# Patient Record
Sex: FEMALE | Race: WHITE | Employment: FULL TIME | URBAN - METROPOLITAN AREA
[De-identification: names, ages, dates, MRNs, and addresses within clinical notes are randomized per-mention and may not be internally consistent; named-entity substitution may affect disease eponyms.]

---

## 2018-02-20 ENCOUNTER — NURSE TRIAGE (OUTPATIENT)
Dept: NURSING | Facility: CLINIC | Age: 38
End: 2018-02-20

## 2018-02-20 NOTE — TELEPHONE ENCOUNTER
Patient states she woke with an UTI this morning and is heading out of town and doesn't believe she will be able to make a clinic appointment to be seen; asking if can prescribe antibiotic over phone.  FNA informed she would have to be seen as has not had a clinic appointment since 2016.  FNA offered to transfer to scheduling for appointment availability and discussed option of [facility].  Patient will look into [facility] options.

## 2018-11-01 ENCOUNTER — TELEPHONE (OUTPATIENT)
Dept: NURSING | Facility: CLINIC | Age: 38
End: 2018-11-01

## 2018-11-05 ENCOUNTER — NURSE TRIAGE (OUTPATIENT)
Dept: NURSING | Facility: CLINIC | Age: 38
End: 2018-11-05

## 2018-11-05 NOTE — TELEPHONE ENCOUNTER
Received letter from HIM stating there are no immunizations found in patients [facility] Records.  Called and left detailed vm regarding above. Encouraged pt to call with any further questions.

## 2018-11-05 NOTE — TELEPHONE ENCOUNTER
Olvin is calling with questions regarding her immunizations and states that she will call back after 8am.

## 2021-04-22 ENCOUNTER — ANCILLARY PROCEDURE (OUTPATIENT)
Dept: MAMMOGRAPHY | Facility: CLINIC | Age: 41
End: 2021-04-22
Attending: OBSTETRICS & GYNECOLOGY
Payer: COMMERCIAL

## 2021-04-22 DIAGNOSIS — Z12.31 VISIT FOR SCREENING MAMMOGRAM: ICD-10-CM

## 2021-04-22 PROCEDURE — 77067 SCR MAMMO BI INCL CAD: CPT | Mod: GC

## 2021-04-22 PROCEDURE — 77063 BREAST TOMOSYNTHESIS BI: CPT | Mod: GC

## 2021-04-26 ENCOUNTER — OFFICE VISIT (OUTPATIENT)
Dept: OBGYN | Facility: CLINIC | Age: 41
End: 2021-04-26
Payer: COMMERCIAL

## 2021-04-26 VITALS
BODY MASS INDEX: 23.3 KG/M2 | DIASTOLIC BLOOD PRESSURE: 70 MMHG | SYSTOLIC BLOOD PRESSURE: 115 MMHG | HEART RATE: 76 BPM | HEIGHT: 69 IN | OXYGEN SATURATION: 98 % | WEIGHT: 157.3 LBS

## 2021-04-26 DIAGNOSIS — Z30.432 ENCOUNTER FOR IUD REMOVAL: ICD-10-CM

## 2021-04-26 DIAGNOSIS — Z13.220 LIPID SCREENING: ICD-10-CM

## 2021-04-26 DIAGNOSIS — Z00.00 ROUTINE GENERAL MEDICAL EXAMINATION AT A HEALTH CARE FACILITY: ICD-10-CM

## 2021-04-26 DIAGNOSIS — Z30.430 ENCOUNTER FOR INSERTION OF MIRENA IUD: ICD-10-CM

## 2021-04-26 DIAGNOSIS — L98.9 SKIN LESION: ICD-10-CM

## 2021-04-26 DIAGNOSIS — Z13.1 SCREENING FOR DIABETES MELLITUS: ICD-10-CM

## 2021-04-26 DIAGNOSIS — Z12.4 SCREENING FOR CERVICAL CANCER: ICD-10-CM

## 2021-04-26 DIAGNOSIS — Z12.31 ENCOUNTER FOR SCREENING MAMMOGRAM FOR BREAST CANCER: Primary | ICD-10-CM

## 2021-04-26 DIAGNOSIS — Z13.29 SCREENING FOR THYROID DISORDER: ICD-10-CM

## 2021-04-26 DIAGNOSIS — Z00.00 ANNUAL PHYSICAL EXAM: Primary | ICD-10-CM

## 2021-04-26 DIAGNOSIS — Z13.1 SCREENING FOR DIABETES MELLITUS: Primary | ICD-10-CM

## 2021-04-26 LAB
CHOLEST SERPL-MCNC: 166 MG/DL
GLUCOSE SERPL-MCNC: 100 MG/DL (ref 70–99)
HDLC SERPL-MCNC: 80 MG/DL
LDLC SERPL CALC-MCNC: 79 MG/DL
NONHDLC SERPL-MCNC: 86 MG/DL
TRIGL SERPL-MCNC: 37 MG/DL
TSH SERPL DL<=0.005 MIU/L-ACNC: 0.99 MU/L (ref 0.4–4)

## 2021-04-26 PROCEDURE — 84443 ASSAY THYROID STIM HORMONE: CPT | Performed by: OBSTETRICS & GYNECOLOGY

## 2021-04-26 PROCEDURE — 80061 LIPID PANEL: CPT | Performed by: OBSTETRICS & GYNECOLOGY

## 2021-04-26 PROCEDURE — 36415 COLL VENOUS BLD VENIPUNCTURE: CPT | Performed by: OBSTETRICS & GYNECOLOGY

## 2021-04-26 PROCEDURE — 99386 PREV VISIT NEW AGE 40-64: CPT | Mod: 25 | Performed by: OBSTETRICS & GYNECOLOGY

## 2021-04-26 PROCEDURE — 87624 HPV HI-RISK TYP POOLED RSLT: CPT | Performed by: OBSTETRICS & GYNECOLOGY

## 2021-04-26 PROCEDURE — 82947 ASSAY GLUCOSE BLOOD QUANT: CPT | Performed by: OBSTETRICS & GYNECOLOGY

## 2021-04-26 PROCEDURE — 58301 REMOVE INTRAUTERINE DEVICE: CPT | Performed by: OBSTETRICS & GYNECOLOGY

## 2021-04-26 PROCEDURE — 58300 INSERT INTRAUTERINE DEVICE: CPT | Performed by: OBSTETRICS & GYNECOLOGY

## 2021-04-26 PROCEDURE — G0145 SCR C/V CYTO,THINLAYER,RESCR: HCPCS | Performed by: OBSTETRICS & GYNECOLOGY

## 2021-04-26 ASSESSMENT — MIFFLIN-ST. JEOR: SCORE: 1439.38

## 2021-04-26 ASSESSMENT — PAIN SCALES - GENERAL: PAINLEVEL: NO PAIN (0)

## 2021-04-26 NOTE — PATIENT INSTRUCTIONS
If you have any questions regarding your visit, Please contact your care team.    [employer]: [phone]      [facility] CLINIC HOURS TELEPHONE NUMBER   Mariana Hernandez DO.    Macy -  Daysi -     ALEXANDER Bragg RN     Monday, Wednesday, [facility]  8:30a.m-5:00 p.m   [facility]  [address] [place] [address]  [phone] ask for [facility]    Imaging [phone]       Urgent Care locations:    [facility] Saturday and Sunday   9 am - 5 pm    Monday-Friday   11 pm - 7 pm  Saturday and Sunday   9 am - 5 pm   [phone]     [facility] Labor and Delivery:  [phone]    If you need a medication refill, please contact your pharmacy. Please allow 3 business days for your refill to be completed.  As always, Thank you for trusting us with your healthcare needs!    Preventive Health Recommendations  Female Ages 40 to 49    Yearly exam:     See your health care provider every year in order to  1. Review health changes.   2. Discuss preventive care.    3. Review your medicines if your doctor prescribed any.      Get a Pap test every three years (unless you have an abnormal result and your provider advises testing more often).      If you get Pap tests with HPV test, you only need to test every 5 years, unless you have an abnormal result. You do not need a Pap test if your uterus was removed (hysterectomy) and you have not had cancer.      You should be tested each year for STDs (sexually transmitted diseases), if you're at risk.     Ask your doctor if you should have a mammogram.      Have a colonoscopy (test for colon cancer) if someone in your family has had colon cancer or polyps before age 50.       Have a cholesterol test every 5 years.       Have a diabetes test (fasting glucose) after  age 45. If you are at risk for diabetes, you should have this test every 3 years.    Shots: Get a flu shot each year. Get a tetanus shot every 10 years.     Nutrition:     Eat at least 5 servings of fruits and vegetables each day.    Eat whole-grain bread, whole-wheat pasta and brown rice instead of white grains and rice.    Get adequate Calcium and Vitamin D.      Lifestyle    Exercise at least 150 minutes a week (an average of 30 minutes a day, 5 days a week). This will help you control your weight and prevent disease.    Limit alcohol to one drink per day.    No smoking.     Wear sunscreen to prevent skin cancer.    See your dentist every six months for an exam and cleaning.

## 2021-04-26 NOTE — PROGRESS NOTES
Olvin is a 41 year old female, , who is here as a new patient to the  gyn dept for her annual exam and IUD removal followed by insertion of a new Mirena.  This will be her fourth Mirena IUD and she has not had a menses since her first one was inserted in .  She uses it for both contraception as well as maintenance of an amenorrheic state.  Her current IUD was placed in 2016 so is due for removal this month.  She does not feel for the strings.  She is in a fasting state this morning so will collect needed labwork.  She is also due for a pap smear but denies any hx of abnormals.  She is overdue for a skin check so will place a Derm referral.    ROS: Ten point review of systems was reviewed and negative except the above.    Health Maintenance   Topic Date Due     ADVANCE CARE PLANNING  Never done     HIV SCREENING  Never done     HEPATITIS C SCREENING  Never done     DTAP/TDAP/TD IMMUNIZATION (1 - Tdap) Never done     PREVENTIVE CARE VISIT  2017     HPV TEST  2018     PAP  2018     PHQ-2  2021     INFLUENZA VACCINE  Completed     COVID-19 Vaccine  Completed     Pneumococcal Vaccine: Pediatrics (0 to 5 Years) and At-Risk Patients (6 to 64 Years)  Aged Out     IPV IMMUNIZATION  Aged Out     MENINGITIS IMMUNIZATION  Aged Out     HEPATITIS B IMMUNIZATION  Aged Out      Last pap: due  Last Mammogram: not due  Last Dexa: not due  Last Colonoscopy: not due  Lab Results   Component Value Date    CHOL 166 2014     Lab Results   Component Value Date    HDL 68 2014     Lab Results   Component Value Date    LDL 88 2014     Lab Results   Component Value Date    TRIG 50 2014     Lab Results   Component Value Date    CHOLHDLRATIO 2.4 2014         OBHX:      PSH:   Past Surgical History:   Procedure Laterality Date     NO HISTORY OF SURGERY           PMH: Her past medical, surgical, and obstetric histories were reviewed and are documented in their appropriate  "chart areas.    ALL/Meds: Her medication and allergy histories were reviewed and are documented in their appropriate chart areas.    SH/FMH: Her social and family history was reviewed and documented in its appropriate chart area.    PE: /70 (BP Location: Right arm, Patient Position: Chair, Cuff Size: Adult Regular)   Pulse 76   Ht 1.763 m (5' 9.41\")   Wt 71.4 kg (157 lb 4.8 oz)   LMP  (LMP Unknown)   SpO2 98%   Breastfeeding No   BMI 22.96 kg/m    Body mass index is 22.96 kg/m .    General Appearance:  healthy, alert, active, no distress  Cardiovascular:  Regular rate and Rhythm without murmur  Neck: Supple, no adenopathy and thyroid normal  Lungs:  Clear, without wheeze, rale or rhonchi  Breast: normal breast exam  Abdomen: Benign, Soft, flat, non-tender, No masses, organomegaly, No inguinal nodes and Bowel sounds normoactive.  Multiple nevi and moles so will refer to Derm for a skin check.   Pelvic:       - Ext: Vulva and perineum are normal without lesion, mass or discharge        - Urethra: normal without discharge        - Urethral Meatus: normal appearance       - Bladder: no tenderness, no masses       - Vagina: Normal mucosa, no discharge        - Cervix: normal and multiparous       - Uterus:Normal shape, position and consistency        - Adnexa: Normal without masses or tenderness       - Rectal: deferred    IUD Procedure Note:  Informed consent was reviewed and obtained for removal of her current IUD followed by insertion of a new Mirena.  Using sterile technique, a bi-valve speculum was placed and her cervix was cleansed with betadine swabs x 3.  The 2 IUD strings of her current Mirena IUD were then grasped with a Ring forceps and it was removed without complication.  This IUD was inspected and found to be intact.  Next, the 12 o'clock position of her cervix was grasped with a single-toothed tenaculum and a new Mirena IUD was inserted per protocol without complication.  Her uterus was " anteverted and sounded to 7.5 cm.  The 2 IUD strings were trimmed to 3 cm each and all instruments were removed.  Counts were correct and follow up instructions were reviewed.  She understands that this new IUD will be due for removal in April 2026.  The patient was provided the reminder wallet card and Jana Mirena IUD booklet.  There were no complications with either procedure.  New Mirena IUD NDC #90846-203-03  Jul 2023  [id number]    A/P:  Well Woman Exam, Expiring IUD Removal, New Mirena IUD Insertion     -  I discussed the new pap recommendations regarding screening.  Explained the rationale for increased intervals between paps.  Questions asked and answered.  She does agree to this regiment.  Pap was obtained and submitted.   -  BC: New IUD (Mirena) inserted and will be due for removal in 4/2026   -  Check fasting labwork today including a lipid profile, glucose, and TSH/free T4   -  Refer to Dermatology for a skin check  Orders Placed This Encounter   Procedures     INSERTION NON-BIODEGRADABLE DRUG DELIVERY IMPLANT     Pap imaged thin layer screen with HPV - recommended age 30 - 65     HPV High Risk Types DNA Cervical     Lipid Profile (Chol, Trig, HDL, LDL calc)     Glucose     TSH with free T4 reflex     DERMATOLOGY ADULT REFERRAL      -  Encouraged self-breast exam   -  Encouraged low fat diet, regular exercise, and adequate calcium intake.    Mariana Hernandez DO  FACOG, FACS

## 2021-04-28 LAB
COPATH REPORT: NORMAL
PAP: NORMAL

## 2021-04-30 LAB
FINAL DIAGNOSIS: NORMAL
HPV HR 12 DNA CVX QL NAA+PROBE: NEGATIVE
HPV16 DNA SPEC QL NAA+PROBE: NEGATIVE
HPV18 DNA SPEC QL NAA+PROBE: NEGATIVE
SPECIMEN DESCRIPTION: NORMAL
SPECIMEN SOURCE CVX/VAG CYTO: NORMAL

## 2021-09-25 ENCOUNTER — HEALTH MAINTENANCE LETTER (OUTPATIENT)
Age: 41
End: 2021-09-25

## 2022-07-02 ENCOUNTER — HEALTH MAINTENANCE LETTER (OUTPATIENT)
Age: 42
End: 2022-07-02

## 2022-12-26 ENCOUNTER — HEALTH MAINTENANCE LETTER (OUTPATIENT)
Age: 42
End: 2022-12-26

## 2023-04-03 ENCOUNTER — ANCILLARY ORDERS (OUTPATIENT)
Dept: OBGYN | Facility: CLINIC | Age: 43
End: 2023-04-03

## 2023-04-03 DIAGNOSIS — Z12.31 VISIT FOR SCREENING MAMMOGRAM: ICD-10-CM

## 2023-04-22 ENCOUNTER — HEALTH MAINTENANCE LETTER (OUTPATIENT)
Age: 43
End: 2023-04-22

## 2023-05-01 NOTE — PATIENT INSTRUCTIONS
If you have any questions regarding your visit, Please contact your care team.     [employer]: [phone]  To Schedule an Appointment 24/7  Call: [phone] HOURS TELEPHONE NUMBER   Asuncion Soria, JUN, APRN, NP-BC    Carlyn -Surgery Scheduler  Daysi - Surgery Scheduler    Mahsa RN  Maira, RN  Salome, ALEXANDER        [facility]  [address] [place] [address] [phone] Phone  [phone] Fax    Imaging Scheduling-All Locations [phone]    [facility]  [address] [place] [address]     Urgent Care locations:  [facility] Monday-Friday   10 am - 8 pm  Saturday and Sunday   9 am - 5 pm [phone]   [facility] Labor and Delivery:  [phone]    If you need a medication refill, please contact your pharmacy. Please allow 3 business days for your refill to be completed.  As always, Thank you for trusting us with your healthcare needs!  see additional instructions from your care team below

## 2023-05-01 NOTE — PROGRESS NOTES
Olvin is a 43 year old  female who presents for annual well woman exam.     HPI:    Olvin is a healthy 43 year old female with no medical concerns at this visit.     GYNECOLOGIC HISTORY:    No LMP recorded. (Menstrual status: IUD). Placed .  Regular menses? No - IUD  Amenorrheic with IUD     Her current contraception method is: IUD.  She  reports that she has never smoked. She has never used smokeless tobacco.    Patient is sexually active.  STD testing offered?  Declined     Last PHQ-9 score on record =       2016    11:04 AM   PHQ-9 SCORE   PHQ-9 Total Score 0     Last GAD7 score on record =       2016    11:04 AM   DEX-7 SCORE   Total Score 0       HEALTH MAINTENANCE:  Cholesterol:   Cholesterol   Date Value Ref Range Status   2021 166 <200 mg/dL Final   2014 166 115 - 199 mg/dL Final     Last Mammo: Today, Result: Normal, Next Mammo: Next year   Pap: (  Lab Results   Component Value Date    PAP NIL 2021    )    Next PAP due: 2026    Colonoscopy:  Will schedule.    Health maintenance updated:  yes    HISTORY:  OB History    Para Term  AB Living   2 2 2 0 0 2   SAB IAB Ectopic Multiple Live Births   0 0 0 0 0      # Outcome Date GA Lbr Doni/2nd Weight Sex Delivery Anes PTL Lv   2 Term 12/16/10 39w0d  3.657 kg (8 lb 1 oz) M  EPI        Name: Logan   1 Term 07 39w0d  3.884 kg (8 lb 9 oz) M  EPI        Name: Mahamed       Patient Active Problem List   Diagnosis     IUD (intrauterine device) in place     Past Surgical History:   Procedure Laterality Date     NO HISTORY OF SURGERY        Social History     Tobacco Use     Smoking status: Never     Smokeless tobacco: Never   Vaping Use     Vaping status: Never Used   Substance Use Topics     Alcohol use: Yes     Comment: OCC      Problem (# of Occurrences) Relation (Name,Age of Onset)    Heart Disease (1) Maternal Grandmother    Hypertension (1) Paternal Grandmother    Breast Cancer (1) Paternal  "Grandmother (70)    Fractures (1) Maternal Grandmother: Hip            No current outpatient medications on file.     Current Facility-Administered Medications   Medication     levonorgestrel (MIRENA) 20 MCG/24HR IUD 20 mcg     No Known Allergies    Past medical, surgical, social and family histories were reviewed and updated in EPIC.    ROS:   12 point review of systems negative other than symptoms noted below or in the HPI.  No urinary frequency or dysuria, bladder or kidney problems    EXAM:  BP 97/64 (BP Location: Right arm, Cuff Size: Adult Regular)   Ht 1.765 m (5' 9.5\")   Wt 75.3 kg (166 lb)   BMI 24.16 kg/m     BMI: Body mass index is 24.16 kg/m .    PHYSICAL EXAM:  Constitutional:   Appearance: Well nourished, well developed, alert, in no acute distress  Neck:  Lymph Nodes:  No lymphadenopathy present    Thyroid:  Gland size normal, nontender, no nodules or masses present  on palpation  Chest:  Respiratory Effort:  Breathing unlabored  Cardiovascular:    Heart: Auscultation:  Regular rate, normal rhythm, no murmurs present  Breasts: Inspection of Breasts:  No lymphadenopathy present., Palpation of Breasts and Axillae:  No masses present on palpation, no breast tenderness., Axillary Lymph Nodes:  No lymphadenopathy present. and No nodularity, asymmetry or nipple discharge bilaterally.  Gastrointestinal:   Abdominal Examination:  Abdomen nontender to palpation, tone normal without rigidity or guarding, no masses present, umbilicus without lesions   Liver and Spleen:  No hepatomegaly present, liver nontender to palpation    Hernias:  No hernias present  Lymphatic: Lymph Nodes:  No other lymphadenopathy present  Skin:  General Inspection:  No rashes present, no lesions present, no areas of  discoloration  Neurologic:    Mental Status:  Oriented X3.  Normal strength and tone, sensory exam                grossly normal, mentation intact and speech normal.    Psychiatric:   Mentation appears normal and affect " normal/bright.         Pelvic Exam: Deferred -- no concerns and PAP not due at this time.  Inguinal Lymph Nodes:     No lymphadenopathy present    COUNSELING:   Reviewed preventive health counseling, as reflected in patient instructions       Regular exercise       Healthy diet/nutrition       Contraception       Family planning    BMI: Body mass index is 24.16 kg/m .      ASSESSMENT/PLAN:  43 year old female with satisfactory annual exam.    (Z01.419) Well woman exam with routine gynecological exam  (primary encounter diagnosis)  Comment: Routine well woman with no concerns. PAP not due, no STI concerns. Declines lab work this year.    (Z12.11) Colon cancer screening  Comment: Routine screening  Plan: Colonoscopy Screening  Referral    Follow up in 1 year, sooner if issues or concerns.    Asuncion Villegas, ELLEN CNP

## 2023-05-05 ENCOUNTER — ANCILLARY PROCEDURE (OUTPATIENT)
Dept: MAMMOGRAPHY | Facility: CLINIC | Age: 43
End: 2023-05-05
Payer: COMMERCIAL

## 2023-05-05 ENCOUNTER — OFFICE VISIT (OUTPATIENT)
Dept: OBGYN | Facility: CLINIC | Age: 43
End: 2023-05-05
Payer: COMMERCIAL

## 2023-05-05 VITALS
SYSTOLIC BLOOD PRESSURE: 97 MMHG | BODY MASS INDEX: 23.77 KG/M2 | WEIGHT: 166 LBS | DIASTOLIC BLOOD PRESSURE: 64 MMHG | HEIGHT: 70 IN

## 2023-05-05 DIAGNOSIS — Z12.11 COLON CANCER SCREENING: ICD-10-CM

## 2023-05-05 DIAGNOSIS — Z01.419 WELL WOMAN EXAM WITH ROUTINE GYNECOLOGICAL EXAM: Primary | ICD-10-CM

## 2023-05-05 DIAGNOSIS — Z12.31 VISIT FOR SCREENING MAMMOGRAM: ICD-10-CM

## 2023-05-05 PROCEDURE — 99396 PREV VISIT EST AGE 40-64: CPT

## 2023-05-05 PROCEDURE — 77067 SCR MAMMO BI INCL CAD: CPT | Mod: GC

## 2023-05-05 PROCEDURE — 77063 BREAST TOMOSYNTHESIS BI: CPT | Mod: GC

## 2024-06-23 ENCOUNTER — HEALTH MAINTENANCE LETTER (OUTPATIENT)
Age: 44
End: 2024-06-23

## 2025-03-31 ENCOUNTER — ANCILLARY ORDERS (OUTPATIENT)
Dept: MAMMOGRAPHY | Facility: CLINIC | Age: 45
End: 2025-03-31
Payer: COMMERCIAL

## 2025-03-31 DIAGNOSIS — Z12.31 SCREENING MAMMOGRAM, ENCOUNTER FOR: Primary | ICD-10-CM

## 2025-04-02 NOTE — PROGRESS NOTES
Olvin is a 45 year old  female who presents for annual exam.     Besides routine health maintenance,  she would like to discuss getting labs and pap UTD.  Also needs to have order for screening colonoscopy.    HPI:  The patient has no primary care provider, is her for annual wellness exam.  Also requesting pap today along with screening labs to be ordered and colonoscopy.  She currently has Mirena IUD that was placed 2021, does not get cycles.  Is reporting some perimenopausal symptoms: hot flushes at night, weight gain, vaginal dryness.    GYNECOLOGIC HISTORY:    No LMP recorded (lmp unknown). (Menstrual status: IUD).    Her current contraception method is: IUD.  She  reports that she has never smoked. She has never used smokeless tobacco.    Patient is sexually active.  STD testing offered?  Declined  Last PHQ-9 score on record =       4/3/2025     3:08 PM   PHQ-9 SCORE   PHQ-9 Total Score 2     Last GAD7 score on record =       4/3/2025     3:08 PM   DEX-7 SCORE   Total Score 3     Alcohol Score = 2    HEALTH MAINTENANCE:  Cholesterol:   Recent Labs   Lab Test 21  0905   CHOL 166   HDL 80   LDL 79   TRIG 37      Cholesterol   Date Value Ref Range Status   2021 166 <200 mg/dL Final   2014 166 115 - 199 mg/dL Final        Last Mammo:  2023 , Result: negative, Next Mammo: Today   Pap:   Lab Results   Component Value Date    PAP NIL HPV NEG 2021      Colonoscopy:  due  Health maintenance updated:  yes    HISTORY:  OB History    Para Term  AB Living   2 2 2 0 0 2   SAB IAB Ectopic Multiple Live Births   0 0 0 0 0      # Outcome Date GA Lbr Doni/2nd Weight Sex Type Anes PTL Lv   2 Term 12/16/10 39w0d  3.657 kg (8 lb 1 oz) M  EPI        Name: Logan   1 Term 07 39w0d  3.884 kg (8 lb 9 oz) M  EPI        Name: Mahamed       Patient Active Problem List   Diagnosis    IUD (intrauterine device) in place    Perimenopause     Past Surgical History:   Procedure  "Laterality Date    NO HISTORY OF SURGERY        Social History     Tobacco Use    Smoking status: Never    Smokeless tobacco: Never   Substance Use Topics    Alcohol use: Yes     Comment: OCC      Problem (# of Occurrences) Relation (Name,Age of Onset)    Heart Disease (1) Maternal Grandmother    Hypertension (1) Paternal Grandmother    Breast Cancer (1) Paternal Grandmother (70)    Fractures (1) Maternal Grandmother: Hip              No current outpatient medications on file.     Current Facility-Administered Medications   Medication Dose Route Frequency Provider Last Rate Last Admin    levonorgestrel (MIRENA) 20 MCG/24HR IUD 20 mcg  1 each Intrauterine Once Mariana Hernandez,          No Known Allergies    Past medical, surgical, social and family histories were reviewed and updated in EPIC.    ROS:   12 point review of systems negative other than symptoms noted below or in the HPI.  Constitutional: Weight Gain  Genitourinary: No Periods and Vaginal Dryness  Endocrine: hot flushes  No urinary frequency or dysuria, bladder or kidney problems    EXAM:  /70   Ht 1.765 m (5' 9.49\")   LMP  (LMP Unknown)   BMI 24.17 kg/m     BMI: Body mass index is 24.17 kg/m .    PHYSICAL EXAM:  Constitutional:   Appearance: Well nourished, well developed, alert, in no acute distress  Neck:  Lymph Nodes:  No lymphadenopathy present    Thyroid:  Gland size normal, nontender, no nodules or masses present  on palpation  Chest:  Respiratory Effort:  Breathing unlabored  Cardiovascular:    Heart: Auscultation:  Regular rate, normal rhythm, no murmurs present  Breasts: Palpation of Breasts and Axillae:  No masses present on palpation, no breast tenderness., Axillary Lymph Nodes:  No lymphadenopathy present., and No nodularity, asymmetry or nipple discharge bilaterally.  Gastrointestinal:   Abdominal Examination:  Abdomen nontender to palpation, tone normal without rigidity or guarding, no masses present, umbilicus without " lesions   Liver and Spleen:  No hepatomegaly present, liver nontender to palpation    Hernias:  No hernias present  Lymphatic: Lymph Nodes:  No other lymphadenopathy present  Skin:  General Inspection:  No rashes present, no lesions present, no areas of  discoloration  Neurologic:    Mental Status:  Oriented X3.  Normal strength and tone, sensory exam                grossly normal, mentation intact and speech normal.    Psychiatric:   Mentation appears normal and affect normal/bright.         Pelvic Exam:  External Genitalia:     Normal appearance for age, no discharge present, no tenderness present, no inflammatory lesions present, color normal  Vagina:    Normal vaginal vault without central or paravaginal defects, no discharge present, no inflammatory lesions present, no masses present  Bladder:     Nontender to palpation  Urethra:   Urethral Body:  Urethra palpation normal, urethra structural support normal   Urethral Meatus:  No erythema or lesions present  Cervix:     Appearance healthy, no lesions present, nontender to palpation, no bleeding present, string present  Uterus:     Nontender to palpation, no masses present, position anteflexed, mobility: normal  Adnexa:     No adnexal tenderness present, no adnexal masses present  Perineum:     Perineum within normal limits, no evidence of trauma, no rashes or skin lesions present  Anus:     Anus within normal limits, no hemorrhoids present  Inguinal Lymph Nodes:     No lymphadenopathy present  Pubic Hair:     Normal pubic hair distribution for age  Genitalia and Groin:     No rashes present, no lesions present, no areas of discoloration, no masses present    BMI: Body mass index is 24.17 kg/m .      ASSESSMENT:  45 year old female with satisfactory annual exam.    ICD-10-CM    1. Women's annual routine gynecological examination  Z01.419 TDAP VACCINE (Adacel, Boostrix)     HPV and Gynecologic Cytology Panel - Recommended Age 30-65 Years     Comprehensive  metabolic panel     Hemoglobin A1c     Lipid Profile     Colonoscopy Screening  Referral     TSH with free T4 reflex      2. Need for vaccination  Z23 TDAP VACCINE (Adacel, Boostrix)      3. Screen for colon cancer  Z12.11 Colonoscopy Screening  Referral      4. Screening for cervical cancer  Z12.4 HPV and Gynecologic Cytology Panel - Recommended Age 30-65 Years      5. Screening for metabolic disorder  Z13.228 Comprehensive metabolic panel     Hemoglobin A1c     Lipid Profile     TSH with free T4 reflex      6. Weight gain  R63.5 Hemoglobin A1c     TSH with free T4 reflex      7. Perimenopause  N95.1       8. IUD (intrauterine device) in place  Z97.5           PLAN:    COUNSELING:   Reviewed preventive health counseling, as reflected in patient instructions  Special attention given to:        Pap per ASCCP guidelines, done today per request       Regular exercise       Healthy diet/nutrition       Colorectal Cancer Screening, order placed       (Chely)menopause management       Mirena IUD now good for 8 years, due for removal/reinsertion 2029       Screening labs ordered       Continue with annual mammogram       Briefly discussed perimenopause symptom management, would like to try lifestyle modifications at this time.         TDAP given today  Return to clinic 1 year or sooner for annual exam    ELLEN HarrisM

## 2025-04-03 ENCOUNTER — OFFICE VISIT (OUTPATIENT)
Dept: MIDWIFE SERVICES | Facility: CLINIC | Age: 45
End: 2025-04-03
Payer: COMMERCIAL

## 2025-04-03 ENCOUNTER — ANCILLARY PROCEDURE (OUTPATIENT)
Dept: MAMMOGRAPHY | Facility: CLINIC | Age: 45
End: 2025-04-03
Payer: COMMERCIAL

## 2025-04-03 ENCOUNTER — ANCILLARY ORDERS (OUTPATIENT)
Dept: OBGYN | Facility: CLINIC | Age: 45
End: 2025-04-03

## 2025-04-03 VITALS — SYSTOLIC BLOOD PRESSURE: 106 MMHG | DIASTOLIC BLOOD PRESSURE: 70 MMHG | BODY MASS INDEX: 24.17 KG/M2 | HEIGHT: 69 IN

## 2025-04-03 DIAGNOSIS — R63.5 WEIGHT GAIN: ICD-10-CM

## 2025-04-03 DIAGNOSIS — Z12.31 SCREENING MAMMOGRAM, ENCOUNTER FOR: ICD-10-CM

## 2025-04-03 DIAGNOSIS — Z23 NEED FOR VACCINATION: ICD-10-CM

## 2025-04-03 DIAGNOSIS — Z13.228 SCREENING FOR METABOLIC DISORDER: ICD-10-CM

## 2025-04-03 DIAGNOSIS — Z12.31 SCREENING MAMMOGRAM, ENCOUNTER FOR: Primary | ICD-10-CM

## 2025-04-03 DIAGNOSIS — Z12.4 SCREENING FOR CERVICAL CANCER: ICD-10-CM

## 2025-04-03 DIAGNOSIS — N95.1 PERIMENOPAUSE: ICD-10-CM

## 2025-04-03 DIAGNOSIS — Z97.5 IUD (INTRAUTERINE DEVICE) IN PLACE: ICD-10-CM

## 2025-04-03 DIAGNOSIS — Z01.419 WOMEN'S ANNUAL ROUTINE GYNECOLOGICAL EXAMINATION: Primary | ICD-10-CM

## 2025-04-03 DIAGNOSIS — Z12.11 SCREEN FOR COLON CANCER: ICD-10-CM

## 2025-04-03 PROCEDURE — 77063 BREAST TOMOSYNTHESIS BI: CPT | Mod: TC | Performed by: RADIOLOGY

## 2025-04-03 PROCEDURE — 77067 SCR MAMMO BI INCL CAD: CPT | Mod: TC | Performed by: RADIOLOGY

## 2025-04-03 ASSESSMENT — PATIENT HEALTH QUESTIONNAIRE - PHQ9
5. POOR APPETITE OR OVEREATING: SEVERAL DAYS
SUM OF ALL RESPONSES TO PHQ QUESTIONS 1-9: 2

## 2025-04-03 ASSESSMENT — ANXIETY QUESTIONNAIRES
IF YOU CHECKED OFF ANY PROBLEMS ON THIS QUESTIONNAIRE, HOW DIFFICULT HAVE THESE PROBLEMS MADE IT FOR YOU TO DO YOUR WORK, TAKE CARE OF THINGS AT HOME, OR GET ALONG WITH OTHER PEOPLE: SOMEWHAT DIFFICULT
GAD7 TOTAL SCORE: 3
5. BEING SO RESTLESS THAT IT IS HARD TO SIT STILL: SEVERAL DAYS
7. FEELING AFRAID AS IF SOMETHING AWFUL MIGHT HAPPEN: NOT AT ALL
2. NOT BEING ABLE TO STOP OR CONTROL WORRYING: NOT AT ALL
6. BECOMING EASILY ANNOYED OR IRRITABLE: NOT AT ALL
3. WORRYING TOO MUCH ABOUT DIFFERENT THINGS: NOT AT ALL
1. FEELING NERVOUS, ANXIOUS, OR ON EDGE: SEVERAL DAYS
GAD7 TOTAL SCORE: 3

## 2025-04-07 LAB
HPV HR 12 DNA CVX QL NAA+PROBE: NEGATIVE
HPV16 DNA CVX QL NAA+PROBE: NEGATIVE
HPV18 DNA CVX QL NAA+PROBE: NEGATIVE
HUMAN PAPILLOMA VIRUS FINAL DIAGNOSIS: NORMAL

## 2025-04-09 LAB
BKR AP ASSOCIATED HPV REPORT: NORMAL
BKR LAB AP GYN ADEQUACY: NORMAL
BKR LAB AP GYN INTERPRETATION: NORMAL
BKR LAB AP PREVIOUS ABNORMAL: NORMAL
PATH REPORT.COMMENTS IMP SPEC: NORMAL
PATH REPORT.COMMENTS IMP SPEC: NORMAL
PATH REPORT.RELEVANT HX SPEC: NORMAL